# Patient Record
Sex: MALE | Race: OTHER | NOT HISPANIC OR LATINO
[De-identification: names, ages, dates, MRNs, and addresses within clinical notes are randomized per-mention and may not be internally consistent; named-entity substitution may affect disease eponyms.]

---

## 2018-10-12 PROBLEM — Z00.00 ENCOUNTER FOR PREVENTIVE HEALTH EXAMINATION: Status: ACTIVE | Noted: 2018-10-12

## 2018-10-17 ENCOUNTER — APPOINTMENT (OUTPATIENT)
Dept: OPHTHALMOLOGY | Facility: CLINIC | Age: 48
End: 2018-10-17
Payer: COMMERCIAL

## 2018-10-17 DIAGNOSIS — H53.10 UNSPECIFIED SUBJECTIVE VISUAL DISTURBANCES: ICD-10-CM

## 2018-10-17 PROCEDURE — 99204 OFFICE O/P NEW MOD 45 MIN: CPT

## 2018-10-17 PROCEDURE — 92083 EXTENDED VISUAL FIELD XM: CPT

## 2018-10-17 RX ORDER — OMEGA-3/DHA/EPA/FISH OIL 300-1000MG
CAPSULE ORAL
Refills: 0 | Status: ACTIVE | COMMUNITY

## 2021-05-10 ENCOUNTER — NON-APPOINTMENT (OUTPATIENT)
Age: 51
End: 2021-05-10

## 2021-05-11 ENCOUNTER — APPOINTMENT (OUTPATIENT)
Dept: ORTHOPEDIC SURGERY | Facility: CLINIC | Age: 51
End: 2021-05-11
Payer: COMMERCIAL

## 2021-05-11 VITALS
HEIGHT: 68 IN | SYSTOLIC BLOOD PRESSURE: 123 MMHG | BODY MASS INDEX: 22.73 KG/M2 | HEART RATE: 61 BPM | DIASTOLIC BLOOD PRESSURE: 69 MMHG | WEIGHT: 150 LBS

## 2021-05-11 DIAGNOSIS — S46.111A STRAIN OF MUSCLE, FASCIA AND TENDON OF LONG HEAD OF BICEPS, RIGHT ARM, INITIAL ENCOUNTER: ICD-10-CM

## 2021-05-11 PROCEDURE — 99204 OFFICE O/P NEW MOD 45 MIN: CPT

## 2021-05-11 PROCEDURE — 99072 ADDL SUPL MATRL&STAF TM PHE: CPT

## 2021-05-11 NOTE — HISTORY OF PRESENT ILLNESS
[de-identified] : Patient is here for right wrist/shoulder pain. These pains began around 2-3 months ago after falling while skiing. He was seen at urgent care centers where xrays were taken that were negative for fracture. He has taken Advil occasionally for pain. He has noticed improvement in his condition but not full resolution of pain. \par \par The patient's past medical history, past surgical history, medications and allergies were reviewed by me today and documented accordingly. In addition, the patient's family and social history, which were noncontributory to this visit, were reviewed also. Intake form was reviewed. The patient has no family history of arthritis.

## 2021-05-11 NOTE — PHYSICAL EXAM
[de-identified] : Constitutional: Well-nourished, well-developed, No acute distress\par Respiratory:  Good respiratory effort, no SOB\par Lymphatic: No regional lymphadenopathy, no lymphedema\par Psychiatric: Pleasant and normal affect, alert and oriented x3\par Musculoskeletal: normal except where as noted in regional exam\par \par Right shoulder:\par APPEARANCE: + Deformity of the biceps muscle consistent with long head biceps tendon rupture, no other marked deformities, no swelling or malalignment\par POSITIVE TENDERNESS: supraspinatus\par NONTENDER:  infraspinatus, teres minor. biceps. anterior and posterior capsule. AC joint. \par ROM: full with mild painful arc past 60 degrees, no scapular winging or dyskinesia present\par RESISTIVE TESTING: MMT 4+/5 ER and empty can, 5/5 IR. painless 5/5 resisted flex/ext, horizontal abd/add \par SPECIAL TESTS: + Alcocer and Neers, mildly + cross arm adduction, neg Speeds, neg Kelsey's, neg Drop Arm, neg Apprehension. neg apley's scratch test\par  \par Left hand:\par APPEARANCE:  mild swelling at first digit MCP joint, no marked deformities or malalignment of the fingers\par POSITIVE TENDERNESS: Thumb UCL\par NONTENDER: all other osseous and ligamentous structures. \par ROM: Limited motion of thumb MCP joint due to pain, otherwise full & painless in CMC, MCP, and IP joints. \par RESISTIVE TESTING: painless resisted testing. \par SPECIAL TESTS: + pain with moderate laxity on valgus stress of the first digit MCP joint, normal sensation of 1st through 5th digits, normal flex/ext, abd/add, and opposition of thumb, no triggering of fingers\par \par Right wrist:\par APPEARANCE: no swelling, no marked deformities or malalignment\par POSITIVE TENDERNESS: Dorsal DRUJ, scapholunate\par NONTENDER: snuffbox, TFCC, FCU/FCR, ECU/ECRL/ECRB, radial/ulnar styloid, CMC, and MCP joints.\par ROM: full with mild pain at endrange of extension  \par RESISTIVE TESTING: Mild pain 4/5 resisted wrist flexion/extension, and radial/ulnar deviation. \par SPECIAL TESTS: neg Finkelstein's. neg Phalen's. neg reverse Phalen's. neg Castañeda's. neg migue test. neg TFCC grind. neg tinel's at the carpal tunnel\par  [de-identified] : I reviewed, interpreted and clinically correlated the following outside imaging studies,\par Urgent care x-rays, 3 views of the right wrist, no abnormal findings.\par Urgent care x-rays, 3 views of the right shoulder, no abnormal findings.

## 2021-05-11 NOTE — DISCUSSION/SUMMARY
[de-identified] : Discussed findings of today's exam and possible causes of patient's pain.  Educated patient on their most probable diagnosis of right shoulder pain status post fall skiing 1-2 months ago with resultant subacromial impingement.  Patient has noted deformity of the biceps muscle secondary to long head biceps rupture, however this precedes the recent shoulder injury, this happened approximately 2 years ago.  Reviewed possible courses of treatment, and we collaboratively decided best course of treatment at this time will include conservative management.  Patient started on a course of oral NSAIDs, prescription given for Mobic (We discussed all possible side effects of this medication).  Patient will be started on a course of physical therapy to restore normal range of motion and strength as tolerated.  Patient advised with the chronicity of his long head biceps rupture I would recommend watchful waiting, surgical intervention at this time would be primarily for cosmetic appearance, it is not providing him any functional deficit over the last few years since his injury.  Patient does not have any evidence of acute rotator cuff tear, recommend deferral of MRI at this time.\par Patient is also seen today for evaluation of right wrist pain status post fall while skiing.  This appears to be a sprain over the dorsal wrist, he does not have any evidence of significant ligament tear or myofascial injury, and patient had x-ray at time of injury which does not show any evidence of acute fracture.  Recommend treatment with oral NSAIDs as above.  If he has persisting pain may consider physical therapy.  If he continues to have localized pain may consider MRI at that time.\par Patient is also seen today for evaluation of left thumb pain 2-month s/p fall while skiing, he has evidence of ulnar collateral ligament sprain, and potentially a significant tear/rupture.  At this time recommend advanced imaging with MRI to evaluate for left thumb UCL rupture.  Patient will follow up when MRI results are available.  Patient appreciates and agrees with current plan.\par \par This note was generated using dragon medical dictation software.  A reasonable effort has been made for proofreading its contents, but typos may still remain.  If there are any questions or points of clarification needed please notify my office.\par

## 2021-06-04 ENCOUNTER — NON-APPOINTMENT (OUTPATIENT)
Age: 51
End: 2021-06-04

## 2021-06-04 DIAGNOSIS — S63.642A SPRAIN OF METACARPOPHALANGEAL JOINT OF LEFT THUMB, INITIAL ENCOUNTER: ICD-10-CM

## 2021-06-07 ENCOUNTER — APPOINTMENT (OUTPATIENT)
Dept: ORTHOPEDIC SURGERY | Facility: CLINIC | Age: 51
End: 2021-06-07
Payer: COMMERCIAL

## 2021-06-07 DIAGNOSIS — M75.41 IMPINGEMENT SYNDROME OF RIGHT SHOULDER: ICD-10-CM

## 2021-06-07 PROCEDURE — 99213 OFFICE O/P EST LOW 20 MIN: CPT | Mod: 25

## 2021-06-07 PROCEDURE — 99072 ADDL SUPL MATRL&STAF TM PHE: CPT

## 2021-06-07 PROCEDURE — 20610 DRAIN/INJ JOINT/BURSA W/O US: CPT | Mod: RT

## 2021-06-07 NOTE — PROCEDURE
[de-identified] : Injection: Right  Shoulder Subacromial Space.\par Indication: Impingement.\par \par A discussion was had with the patient regarding this procedure and all questions were answered. All risks, benefits and alternatives were discussed. These included but were not limited to bleeding, infection, and allergic reaction.  A timeout was done to ensure correct side and pt agreed to the procedure.   Alcohol was used to clean the skin, and betadine was used to sterilize and prep the area in the posterior aspect of the shoulder. Ethyl chloride spray was then used as a topical anesthetic. A 22-gauge 1.5" needle was used to inject 2cc of 0.25% bupivacaine without epinephrine and 1cc of 40mg/ml methylprednisolone into the subacromial space. A sterile bandage was then applied. The patient tolerated the procedure well and there were no complications.\par

## 2021-06-07 NOTE — HISTORY OF PRESENT ILLNESS
[de-identified] : Patient is here today for follow-up and continue management of right shoulder pain. Since last visit patient has been unable to start physical therapy, he has not been able to find a time in his schedule to do so. He is still having pain with overhead activity, pain with sleeping on his side at night, and describes the pain as a strong achy pain in the shoulder area that radiates into the upper arm. No numbness/tingling, no radiation of pain beyond the elbow. No other work-up or treatment as of yet.

## 2021-06-07 NOTE — DISCUSSION/SUMMARY
[de-identified] : Patient was seen today for reevaluation and continue management of right shoulder pain secondary to subacromial impingement.  We discussed enrollment in the FDA trial looking at long-acting triamcinolone injection, patient was concerned about potential long-term side effects and elected to defer this injection today.  We discussed utilization of regular short acting Depo-Medrol, patient was more comfortable with this injection therapy today.  We discussed various treatment options as well as associated risk/benefits/alternatives and patient elected to proceed with cortisone injection today (see procedure note).  Informed the patient that the numbing medicine in today's injection will last for about 4-6 hours. The steroid that was injected will start to work in 1 to 2 days, peak at 1-2 weeks, and may last up to 1-2 months.  Patient will be started on a course of physical therapy to restore normal range of motion and strength as tolerated.  If patient does not have improvement with the above measures would consider MRI at that time.  Follow up as needed.  Patient appreciates and agrees with current plan.\par \par This note was generated using dragon medical dictation software.  A reasonable effort has been made for proofreading its contents, but typos may still remain.  If there are any questions or points of clarification needed please notify my office.\par

## 2021-06-07 NOTE — PHYSICAL EXAM
[de-identified] : Constitutional: Well-nourished, well-developed, No acute distress\par Respiratory:  Good respiratory effort, no SOB\par Lymphatic: No regional lymphadenopathy, no lymphedema\par Psychiatric: Pleasant and normal affect, alert and oriented x3\par Musculoskeletal: normal except where as noted in regional exam\par \par Right shoulder:\par APPEARANCE: + Deformity of the biceps muscle consistent with long head biceps tendon rupture, no other marked deformities, no swelling or malalignment\par POSITIVE TENDERNESS: supraspinatus\par NONTENDER:  infraspinatus, teres minor. biceps. anterior and posterior capsule. AC joint. \par ROM: full with mild painful arc past 60 degrees, no scapular winging or dyskinesia present\par RESISTIVE TESTING: MMT 4+/5 ER and empty can, 5/5 IR. painless 5/5 resisted flex/ext, horizontal abd/add \par SPECIAL TESTS: + Alcocer and Neers, mildly + cross arm adduction, neg Speeds, neg Kelsey's, neg Drop Arm, neg Apprehension. neg apley's scratch test\par

## 2021-06-21 ENCOUNTER — APPOINTMENT (OUTPATIENT)
Dept: ORTHOPEDIC SURGERY | Facility: CLINIC | Age: 51
End: 2021-06-21
Payer: COMMERCIAL

## 2021-06-21 VITALS
SYSTOLIC BLOOD PRESSURE: 120 MMHG | WEIGHT: 145 LBS | BODY MASS INDEX: 21.98 KG/M2 | HEART RATE: 62 BPM | HEIGHT: 68 IN | DIASTOLIC BLOOD PRESSURE: 81 MMHG

## 2021-06-21 DIAGNOSIS — Z78.9 OTHER SPECIFIED HEALTH STATUS: ICD-10-CM

## 2021-06-21 DIAGNOSIS — Z80.41 FAMILY HISTORY OF MALIGNANT NEOPLASM OF OVARY: ICD-10-CM

## 2021-06-21 DIAGNOSIS — M25.531 PAIN IN RIGHT WRIST: ICD-10-CM

## 2021-06-21 PROCEDURE — 99214 OFFICE O/P EST MOD 30 MIN: CPT

## 2021-06-21 PROCEDURE — 99072 ADDL SUPL MATRL&STAF TM PHE: CPT

## 2021-06-22 PROBLEM — M25.531 RIGHT WRIST PAIN: Status: ACTIVE | Noted: 2021-05-11

## 2021-07-29 ENCOUNTER — APPOINTMENT (OUTPATIENT)
Dept: ORTHOPEDIC SURGERY | Facility: CLINIC | Age: 51
End: 2021-07-29
Payer: COMMERCIAL

## 2021-07-29 DIAGNOSIS — S63.642D SPRAIN OF METACARPOPHALANGEAL JOINT OF LEFT THUMB, SUBSEQUENT ENCOUNTER: ICD-10-CM

## 2021-07-29 PROCEDURE — 99214 OFFICE O/P EST MOD 30 MIN: CPT

## 2021-07-29 RX ORDER — MELOXICAM 7.5 MG/1
7.5 TABLET ORAL
Qty: 30 | Refills: 0 | Status: COMPLETED | COMMUNITY
Start: 2021-05-11 | End: 2021-07-29

## 2021-09-09 ENCOUNTER — OUTPATIENT (OUTPATIENT)
Dept: OUTPATIENT SERVICES | Facility: HOSPITAL | Age: 51
LOS: 1 days | End: 2021-09-09
Payer: COMMERCIAL

## 2021-09-09 VITALS
OXYGEN SATURATION: 100 % | TEMPERATURE: 97 F | HEIGHT: 68 IN | RESPIRATION RATE: 16 BRPM | DIASTOLIC BLOOD PRESSURE: 82 MMHG | SYSTOLIC BLOOD PRESSURE: 124 MMHG | HEART RATE: 55 BPM | WEIGHT: 149.91 LBS

## 2021-09-09 DIAGNOSIS — S63.642A SPRAIN OF METACARPOPHALANGEAL JOINT OF LEFT THUMB, INITIAL ENCOUNTER: ICD-10-CM

## 2021-09-09 DIAGNOSIS — D17.9 BENIGN LIPOMATOUS NEOPLASM, UNSPECIFIED: Chronic | ICD-10-CM

## 2021-09-09 DIAGNOSIS — Z01.818 ENCOUNTER FOR OTHER PREPROCEDURAL EXAMINATION: ICD-10-CM

## 2021-09-09 DIAGNOSIS — S63.642D SPRAIN OF METACARPOPHALANGEAL JOINT OF LEFT THUMB, SUBSEQUENT ENCOUNTER: ICD-10-CM

## 2021-09-09 LAB
HCT VFR BLD CALC: 43.4 % — SIGNIFICANT CHANGE UP (ref 39–50)
HGB BLD-MCNC: 13.5 G/DL — SIGNIFICANT CHANGE UP (ref 13–17)
MCHC RBC-ENTMCNC: 27 PG — SIGNIFICANT CHANGE UP (ref 27–34)
MCHC RBC-ENTMCNC: 31.1 GM/DL — LOW (ref 32–36)
MCV RBC AUTO: 86.8 FL — SIGNIFICANT CHANGE UP (ref 80–100)
NRBC # BLD: 0 /100 WBCS — SIGNIFICANT CHANGE UP (ref 0–0)
PLATELET # BLD AUTO: 274 K/UL — SIGNIFICANT CHANGE UP (ref 150–400)
RBC # BLD: 5 M/UL — SIGNIFICANT CHANGE UP (ref 4.2–5.8)
RBC # FLD: 13.2 % — SIGNIFICANT CHANGE UP (ref 10.3–14.5)
WBC # BLD: 6.04 K/UL — SIGNIFICANT CHANGE UP (ref 3.8–10.5)
WBC # FLD AUTO: 6.04 K/UL — SIGNIFICANT CHANGE UP (ref 3.8–10.5)

## 2021-09-09 PROCEDURE — G0463: CPT

## 2021-09-09 PROCEDURE — 85027 COMPLETE CBC AUTOMATED: CPT

## 2021-09-09 NOTE — H&P PST ADULT - SKIN COMMENTS
Pt. will call the surgeon tomorrow to inform about recent abscess. Will email from PST also. Pt. will call the surgeon tomorrow to inform about recent abscess. Will inform from PST also. Pt. will call the surgeon tomorrow to inform about recent abscess. Surgeon notified.

## 2021-09-09 NOTE — H&P PST ADULT - HISTORY OF PRESENT ILLNESS
50 y.o. male with  c/o Left thumb pain s/p multiple injuries related to skiing and activity over the past year. An MRI from 05/25/21 showed a Left thumb ulnar collateral ligament tear. He is scheduled for Left Thumb Ulnar Collateral Ligament Reconstruction with [employer].     Covid testing scheduled for 09/17/21.  50 y.o. male with  c/o Left thumb pain s/p multiple injuries related to skiing and activity over the past year. An MRI from 05/25/21 showed a Left thumb ulnar collateral ligament tear. He is scheduled for Left Thumb Ulnar Collateral Ligament Reconstruction with [employer].     Covid testing scheduled for 09/17/21.  50 y.o. male c/o Left thumb pain s/p multiple injuries related to skiing and activity over the past year. An MRI from 05/25/21 showed a Left thumb ulnar collateral ligament tear. He is scheduled for Left Thumb Ulnar Collateral Ligament Reconstruction with [employer].     Covid testing scheduled for 09/17/21.

## 2021-09-09 NOTE — H&P PST ADULT - NS MD HP SKIN WOUND STATUS
Left upper forearm 1 cm healing wound and small lump s/p I&D of an abscess about 10 days ago at plastic surgeon's office (Pt. doesn't recall the name). Finished abx yesterday.

## 2021-09-09 NOTE — H&P PST ADULT - NSICDXPASTMEDICALHX_GEN_ALL_CORE_FT
PAST MEDICAL HISTORY:  Sprain of metacarpophalangeal joint of left thumb, initial encounter      PAST MEDICAL HISTORY:  Multiple lipomas     Right shoulder tendonitis 2021    Rupture of tendon of biceps, long head, right, initial encounter 2019    Skin abscess - left upper forearm    Sprain of metacarpophalangeal joint of left thumb, initial encounter 2021

## 2021-09-09 NOTE — H&P PST ADULT - SKIN/BREAST COMMENTS
Left upper forearm 1 cm healing wound s/p I&D of an abscess about 10 days ago. Finished abx yesterday. s/p I&D of an abscess Left forearm about 10 days ago. Finished abx yesterday.

## 2021-09-15 PROBLEM — S46.111A STRAIN OF MUSCLE, FASCIA AND TENDON OF LONG HEAD OF BICEPS, RIGHT ARM, INITIAL ENCOUNTER: Chronic | Status: ACTIVE | Noted: 2021-09-09

## 2021-09-15 PROBLEM — S63.642A SPRAIN OF METACARPOPHALANGEAL JOINT OF LEFT THUMB, INITIAL ENCOUNTER: Chronic | Status: ACTIVE | Noted: 2021-09-09

## 2021-09-15 PROBLEM — L02.91 CUTANEOUS ABSCESS, UNSPECIFIED: Chronic | Status: ACTIVE | Noted: 2021-09-09

## 2021-09-15 PROBLEM — M77.8 OTHER ENTHESOPATHIES, NOT ELSEWHERE CLASSIFIED: Chronic | Status: ACTIVE | Noted: 2021-09-09

## 2021-09-15 PROBLEM — D17.9 BENIGN LIPOMATOUS NEOPLASM, UNSPECIFIED: Chronic | Status: ACTIVE | Noted: 2021-09-09

## 2021-09-20 ENCOUNTER — APPOINTMENT (OUTPATIENT)
Dept: ORTHOPEDIC SURGERY | Facility: HOSPITAL | Age: 51
End: 2021-09-20

## 2021-10-01 ENCOUNTER — APPOINTMENT (OUTPATIENT)
Dept: ORTHOPEDIC SURGERY | Facility: CLINIC | Age: 51
End: 2021-10-01

## 2021-10-06 ENCOUNTER — APPOINTMENT (OUTPATIENT)
Dept: INFECTIOUS DISEASE | Facility: CLINIC | Age: 51
End: 2021-10-06
Payer: COMMERCIAL

## 2021-10-06 VITALS
OXYGEN SATURATION: 100 % | SYSTOLIC BLOOD PRESSURE: 108 MMHG | DIASTOLIC BLOOD PRESSURE: 77 MMHG | HEART RATE: 69 BPM | BODY MASS INDEX: 22.73 KG/M2 | HEIGHT: 68 IN | WEIGHT: 150 LBS | TEMPERATURE: 98.1 F

## 2021-10-06 PROCEDURE — 99213 OFFICE O/P EST LOW 20 MIN: CPT

## 2021-10-06 NOTE — ASSESSMENT
[FreeTextEntry1] : Status post incision and drainage of abscess left forearm.  He is doing well.  I have no real suspicion for any immune issue here, but I will send appropriate screening tests.  We did discuss the transient nature potentially of MRSA colonization.  We discussed the lack of data for checking a nasal swab, as the colonization can be transient.  Nasal colonization in and of itself does not mean the patient needs treatment.  We discussed the multimodal treatment of colonization for recurrent, significant disease, and the relatively little data there is to support it (e.g. open heart surgery, knee replacement, hip replacement, nursing homes, etc.).  I do not think he merits decolonization at this point in time.  We did discuss that if she should have significant recurrent infections, that this could be revisited at any point in time.  I think that he should see a dermatologist, if he does have psoriasis or eczema appropriate treatment of these will diminish the staphylococcal burden, and potentially diminish the likelihood of subsequent infection.  I would also want the dermatologist to look at this mole/lesion on the upper back/base of his neck.  The patient voiced understanding.  I do not think the patient's headaches have any direct bearing on this is staphylococcal infection.  The patient has seen a neurologist in the past after his prior head trauma, and I encouraged him to do so again given the change in the pattern of his headaches.  I do not think this gentleman merits having blood cultures performed at this juncture, suspicion for systemic illness at this point in time is nil.\par \par Patient may follow-up as needed

## 2021-10-06 NOTE — HISTORY OF PRESENT ILLNESS
[FreeTextEntry1] : This is a 50-year-old man with history of multiple orthopedic injuries, multiple lipomas excised, prior concussion, chronic headaches recently worse over the last several months, who had a abscess that required drainage on his left forearm in August 2021.  No antecedent risk factors for MRSA were elicited.  Responded nicely to drainage and antibiotics.  He also socially had a small folliculitis type lesions in his groin which resolved.  He has had issues with acne-like facial rash, but he has had similar issues in the more distant past.  He has had no fevers, chills, rigors or systemic illness.  He is overall been feeling fatigued but otherwise reasonably well.  He presents now because he is concerned that he might have a systemic staphylococcal infection.\par \par Patient denies any history of immune compromise.  He did has not had a prior history of skin and soft tissue infections other than the aforementioned acneiform rash on his face in the past.  He does have some eczematous/psoriasis type rash intermittently on his forearms, anterior shins, knees, elbows though it at other times he thinks is just dry skin.  He has not seen a dermatologist.  His son has significant allergies and eczema.\par \par The patient has no significant occupational or travel exposures.  There are no pets in the home. 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

## 2021-10-06 NOTE — PHYSICAL EXAM
[General Appearance - Alert] : alert [General Appearance - In No Acute Distress] : in no acute distress [General Appearance - Well-Appearing] : healthy appearing [Sclera] : the sclera and conjunctiva were normal [Examination Of The Oral Cavity] : the lips and gums were normal [Neck Appearance] : the appearance of the neck was normal [Auscultation Breath Sounds / Voice Sounds] : lungs were clear to auscultation bilaterally [Murmurs] : no murmurs [Heart Sounds] : normal S1 and S2 [Edema] : there was no peripheral edema [Abdomen Tenderness] : non-tender [Abdomen Soft] : soft [] : no hepato-splenomegaly [No Palpable Adenopathy] : no palpable adenopathy [Musculoskeletal - Swelling] : no joint swelling [Oriented To Time, Place, And Person] : oriented to person, place, and time [FreeTextEntry1] : Seems anxious slightly more than I would expect for current situation

## 2021-10-07 LAB
ALBUMIN SERPL ELPH-MCNC: 4.4 G/DL
ALP BLD-CCNC: 53 U/L
ALT SERPL-CCNC: 18 U/L
ANION GAP SERPL CALC-SCNC: 13 MMOL/L
AST SERPL-CCNC: 23 U/L
BASOPHILS # BLD AUTO: 0.04 K/UL
BASOPHILS NFR BLD AUTO: 0.6 %
BILIRUB SERPL-MCNC: 0.3 MG/DL
BUN SERPL-MCNC: 10 MG/DL
CALCIUM SERPL-MCNC: 9.4 MG/DL
CD3 CELLS # BLD: 813 /UL
CD3 CELLS NFR BLD: 67 %
CD3+CD4+ CELLS # BLD: 639 /UL
CD3+CD4+ CELLS NFR BLD: 52 %
CD3+CD4+ CELLS/CD3+CD8+ CLL SPEC: 3.75 RATIO
CD3+CD8+ CELLS # SPEC: 170 /UL
CD3+CD8+ CELLS NFR BLD: 14 %
CHLORIDE SERPL-SCNC: 101 MMOL/L
CO2 SERPL-SCNC: 28 MMOL/L
CREAT SERPL-MCNC: 1.27 MG/DL
CRP SERPL-MCNC: <3 MG/L
EOSINOPHIL # BLD AUTO: 0.11 K/UL
EOSINOPHIL NFR BLD AUTO: 1.6 %
ERYTHROCYTE [SEDIMENTATION RATE] IN BLOOD BY WESTERGREN METHOD: 8 MM/HR
GLUCOSE SERPL-MCNC: 88 MG/DL
HCT VFR BLD CALC: 45.2 %
HGB BLD-MCNC: 14.1 G/DL
HIV1+2 AB SPEC QL IA.RAPID: NONREACTIVE
IMM GRANULOCYTES NFR BLD AUTO: 0.6 %
LYMPHOCYTES # BLD AUTO: 1.37 K/UL
LYMPHOCYTES NFR BLD AUTO: 20.1 %
MAN DIFF?: NORMAL
MCHC RBC-ENTMCNC: 27.3 PG
MCHC RBC-ENTMCNC: 31.2 GM/DL
MCV RBC AUTO: 87.4 FL
MONOCYTES # BLD AUTO: 0.47 K/UL
MONOCYTES NFR BLD AUTO: 6.9 %
NEUTROPHILS # BLD AUTO: 4.77 K/UL
NEUTROPHILS NFR BLD AUTO: 70.2 %
PLATELET # BLD AUTO: 293 K/UL
POTASSIUM SERPL-SCNC: 4.6 MMOL/L
PROT SERPL-MCNC: 6.6 G/DL
RBC # BLD: 5.17 M/UL
RBC # FLD: 13.1 %
RHEUMATOID FACT SER QL: <10 IU/ML
SODIUM SERPL-SCNC: 142 MMOL/L
WBC # FLD AUTO: 6.8 K/UL

## 2021-10-13 LAB
ANA SER IF-ACNC: NEGATIVE
IGA SER QL IEP: 184 MG/DL
IGG SER QL IEP: 868 MG/DL
IGG SUBSET TOTAL IGG: 813 MG/DL
IGG1 SER-MCNC: 461 MG/DL
IGG2 SER-MCNC: 249 MG/DL
IGG3 SER-MCNC: 54 MG/DL
IGG4 SER-MCNC: 64 MG/DL
IGM SER QL IEP: 99 MG/DL

## 2023-07-28 ENCOUNTER — APPOINTMENT (OUTPATIENT)
Dept: INFECTIOUS DISEASE | Facility: CLINIC | Age: 53
End: 2023-07-28
Payer: COMMERCIAL

## 2023-07-28 ENCOUNTER — NON-APPOINTMENT (OUTPATIENT)
Age: 53
End: 2023-07-28

## 2023-07-28 VITALS
DIASTOLIC BLOOD PRESSURE: 75 MMHG | BODY MASS INDEX: 21.98 KG/M2 | OXYGEN SATURATION: 98 % | WEIGHT: 145 LBS | SYSTOLIC BLOOD PRESSURE: 113 MMHG | HEIGHT: 68 IN | HEART RATE: 51 BPM | TEMPERATURE: 97.8 F

## 2023-07-28 DIAGNOSIS — Z22.322 CARRIER OR SUSPECTED CARRIER OF METHICILLIN RESISTANT STAPHYLOCOCCUS AUREUS: ICD-10-CM

## 2023-07-28 DIAGNOSIS — L73.8 OTHER SPECIFIED FOLLICULAR DISORDERS: ICD-10-CM

## 2023-07-28 PROCEDURE — 99214 OFFICE O/P EST MOD 30 MIN: CPT

## 2023-07-28 NOTE — PHYSICAL EXAM
[General Appearance - Alert] : alert [General Appearance - In No Acute Distress] : in no acute distress [Sclera] : the sclera and conjunctiva were normal [PERRL With Normal Accommodation] : pupils were equal in size, round, reactive to light [Extraocular Movements] : extraocular movements were intact [Outer Ear] : the ears and nose were normal in appearance [Oropharynx] : the oropharynx was normal with no thrush [Neck Appearance] : the appearance of the neck was normal [] : no respiratory distress [Auscultation Breath Sounds / Voice Sounds] : lungs were clear to auscultation bilaterally [Heart Rate And Rhythm] : heart rate was normal and rhythm regular [Heart Sounds] : normal S1 and S2 [Heart Sounds Gallop] : no gallops [Murmurs] : no murmurs [Heart Sounds Pericardial Friction Rub] : no pericardial rub [Abdomen Tenderness] : non-tender [FreeTextEntry1] : Distal RUE s/p I&D, no purulence noted; flesh-colored skin lesion noted. no erythema; scant shiny-appearing discoloration at antecubital fossa b/l [No Focal Deficits] : no focal deficits

## 2023-07-28 NOTE — ASSESSMENT
[FreeTextEntry1] : 50-yo M w/ PMH of multiple orthopedic injuries, s/p multiple lipoma excision, and last visit to ID clinic with concerns for MRSA colonization, presenting with concerns for MRSA infection.\par \par #MRSA colonization\par #Bacterial folliculitis\par - Recurrent folliculitis, possibly a/w hairy skin and razorblade use. Discussed hygiene re: shaving\par - Discussed on the nature of Staph aureus on skin. Discussed on possibility of the use of chlorhexidine shower for MRSA colonization eradication, however it (1) may recur, and (2) may not prevent folliculitis itself\par - Patient may benefit from eczema care from dermatology\par - Discussed non-irritant skin products and hydration\par - Discussed on previous lab result (2021). Would offer no further testing for "systemic infection"\par - No antimicrobials to prescribe at this time. Patient is in agreement.

## 2023-07-28 NOTE — HISTORY OF PRESENT ILLNESS
[FreeTextEntry1] : 50-yo M w/ PMH of multiple orthopedic injuries, s/p multiple lipoma excision, and last visit to ID clinic with concerns for MRSA colonization, presenting with concerns for MRSA infection.\par \par Patient reports he has a propensity to have boils on the skin, including BUE, flexural surfaces of the antecubital and popliteal fossa, and abdominal fold. Also happens on the flat surfaces of the body.\par \par Patient recently developed a big boil to the anterior aspect of the distal RUE. S/p I&D, and pt was told it was MRSA by the surgeon (no record available - patient reported sending it over to our clinic), and currently on antibiotics (name unclear - 1 tab BID x10d). Currently much improved. Referred here for a possible workup of active infectious disease.\par \par Patient has body hair, and stated he would use battery-powered razor to shave once in a while. Also uses Dove product, including other store products for skin care.\par

## 2023-07-28 NOTE — REVIEW OF SYSTEMS
[Skin Lesions] : skin lesion [Itching] : itching [Fever] : no fever [Chills] : no chills [Body Aches] : no body aches [Discharge From Eyes] : no purulent discharge from the eyes [Nasal Discharge] : no nasal discharge [Hoarseness] : no hoarseness [Chest Pain] : no chest pain [Lower Ext Edema] : no extremity edema [Shortness Of Breath] : no shortness of breath [Cough] : no cough [Sputum] : not coughing up ~M sputum [Dysuria] : no dysuria [Joint Pain] : no joint pain [Joint Swelling] : no joint swelling [Joint Stiffness] : no joint stiffness [Confused] : no confusion [Dizziness] : no dizziness [Suicidal] : not suicidal [Anxiety] : no anxiety [Swollen Glands] : no swollen glands

## 2024-01-31 ENCOUNTER — APPOINTMENT (OUTPATIENT)
Dept: ORTHOPEDIC SURGERY | Facility: CLINIC | Age: 54
End: 2024-01-31
Payer: COMMERCIAL

## 2024-01-31 DIAGNOSIS — S83.412A SPRAIN OF MEDIAL COLLATERAL LIGAMENT OF LEFT KNEE, INITIAL ENCOUNTER: ICD-10-CM

## 2024-01-31 DIAGNOSIS — S63.637A SPRAIN OF INTERPHALANGEAL JOINT OF LEFT LITTLE FINGER, INITIAL ENCOUNTER: ICD-10-CM

## 2024-01-31 DIAGNOSIS — M70.62 TROCHANTERIC BURSITIS, LEFT HIP: ICD-10-CM

## 2024-01-31 PROCEDURE — 99213 OFFICE O/P EST LOW 20 MIN: CPT

## 2024-01-31 NOTE — DISCUSSION/SUMMARY
[de-identified] : Discussed findings of today's exam and possible causes of patient's pain.  Educated patient on their most probable diagnosis of acute left knee pain 1 week status post slip and twist on a wet surface with resulting grade 1 MCL sprain.  Reviewed possible courses of treatment, and we collaboratively decided best course of treatment at this time will include conservative management.  Patient has no effusion, no evidence of acute internal derangement, no immediate surgical indications, recommend deferral of MRI at this time.  Patient was fitted with a hinged knee compression brace to be worn during the day for support.  Patient is advised he likely has a grade 1 sprain, , this injury can take 4-6 weeks to fully resolve, this is unlikely to require surgical intervention, therefore MRI is not needed at this time.  Patient will be started on a course of physical therapy to restore normal range of motion and strength as tolerated. Patient was also seen today for evaluation management of lateral left hip pain, he likely has a hip contusion with resultant trochanteric bursitis.  There is no evidence of internal derangement of the hip, no evidence of any high-grade myofascial tear or rupture, again no surgical indications and no need for MRI.  He would benefit from a short course of anti-inflammatory, recommend Advil 600 mg p.o. twice daily with food consistently for 1 week, then as needed thereafter.  If this issue does not resolve over the next few weeks he can add this to his course of PT. Patient was also seen today for evaluation management of left hand fifth digit PIP sprain, this is again a grade 1 sprain.  He already has full range of motion and full strength.  No need for immobilization or buddy taping at this time.  No need for hand therapy at this time. Follow up as needed.  Patient appreciates and agrees with current plan.  I work as part of an academic orthopedic group and routinely have a physician in training (resident / fellow) working with me.  Any part of the history and physical exam performed by the physician in training was either directly reviewed and/or replicated by myself.  This note was generated using dragon medical dictation software.  A reasonable effort has been made for proofreading its contents, but typos may still remain.  If there are any questions or points of clarification needed please notify my office.

## 2024-01-31 NOTE — REASON FOR VISIT
[Follow-Up Visit] : a follow-up visit for [Knee Pain] : knee pain [FreeTextEntry2] : left hip and knee pain, L 5th digit hand pain

## 2024-01-31 NOTE — PHYSICAL EXAM
[de-identified] : Constitutional: Well-nourished, well-developed, No acute distress Respiratory:  Good respiratory effort, no SOB Lymphatic: No regional lymphadenopathy, no lymphedema Psychiatric: Pleasant and normal affect, alert and oriented x3 Musculoskeletal: normal except where as noted in regional exam  Left knee: APPEARANCE: no swelling, no marked deformities or malalignment POSITIVE TENDERNESS: Moderate tenderness at the MCL, most notably at the midpoint and tibial insertion, + medial joint line tenderness NONTENDER: lateral jt line & retinacula, patellar & quadriceps tendons, LCL, ITB at the lateral femoral condyle & Gerdy's tubercle, pes bursa.  ROM: full ext, with mild medial knee pain in mild restriction in deep flexion.  RESISTIVE TESTING: painless resisted knee flex/ext.  SPECIAL TESTS: + Valgus stress test with pain but no laxity, stable varus stress.  Justine's recreated medial knee pain, no locking or clicking noted.  painless grind. neg Lachman's. neg ant/post drawer.    Left hip: APPEARANCE: no marked deformities, no swelling or malalignment POSITIVE TENDERNESS: Greater trochanter, and mild distally along ITB NONTENDER: TFL, gluteal region, ischium/proximal hamstring region, hip flexor region, sartorius and pubic symphysis.  ROM: full & painless.  RESISTIVE TESTING: Mild pain in lateral hip with resisted abduction, painless resisted ER/IR/SLR/adduction.  SPECIAL TESTS: neg JUDE/FADIR, painless loaded flexion & scouring  Left hand: APPEARANCE:  + Mild swelling overlying the lateral portion of the fifth digit PIP joint, no marked deformities or malalignment of the fingers POSITIVE TENDERNESS: + Moderate tenderness of the radial collateral ligament at the fifth digit PIP joint NONTENDER: all other osseous and ligamentous structures.  ROM: full & painless in CMC, MCP, and IP joints.  RESISTIVE TESTING: painless resisted testing.  SPECIAL TESTS: + Varus stress test at the fifth digit PIP joint for pain but no laxity, negative valgus stress test at this joint Vasc: 2+ radial pulse, normal capillary refill Neuro: AIN, PIN, Ulnar nerve intact to motor Sensation: Intact to light touch throughout

## 2024-01-31 NOTE — REVIEW OF SYSTEMS
[Arthralgia] : arthralgia [Joint Stiffness] : joint stiffness [Negative] : Heme/Lymph [Joint Pain] : no joint pain

## 2024-01-31 NOTE — HISTORY OF PRESENT ILLNESS
[de-identified] : 54 y/o M presents s/p fall while playing squash 1 week ago. He was going for the fall and slipped while hyperflexing his right hip and hitting the inner aspect of his left knee. He subsequently developed left knee and outer left hip pain. He went to [facility] where x-rays were taken which were negative. He has been favoring his right side and has difficulty putting weight on his left leg. He did not his knee initially was swollen but that has subsided. Denies any back pain or radicular symptoms. Did not notice any significant bruising.  Patient is also having pain of the left hand fifth digit PIP joint.  He does not recall exactly what happened but he remembers having to reach back with his left hand.  He has been having pain at the PIP joint with some swelling in the area as well since time of injury.  No workup or treatment for this issue as of yet.

## 2024-02-28 ENCOUNTER — NON-APPOINTMENT (OUTPATIENT)
Age: 54
End: 2024-02-28

## 2024-03-25 ENCOUNTER — NON-APPOINTMENT (OUTPATIENT)
Age: 54
End: 2024-03-25

## 2024-09-19 ENCOUNTER — APPOINTMENT (OUTPATIENT)
Dept: ORTHOPEDIC SURGERY | Facility: CLINIC | Age: 54
End: 2024-09-19
Payer: COMMERCIAL

## 2024-09-19 DIAGNOSIS — S83.412A SPRAIN OF MEDIAL COLLATERAL LIGAMENT OF LEFT KNEE, INITIAL ENCOUNTER: ICD-10-CM

## 2024-09-19 PROCEDURE — 99213 OFFICE O/P EST LOW 20 MIN: CPT

## 2024-09-25 NOTE — HISTORY OF PRESENT ILLNESS
[de-identified] : 52 yo M with hx of MCL sprain previously seen in [place] for knee pain presenting today for similar concerns, reports his left knee pain has been persistent and preventing him from enjoying his regular activities not taking anything for pain, has not taken any NSAIDs/Tylenol since initial injury was doing PT but states he has had a change in insurance and has not gone back in several months

## 2024-09-25 NOTE — DISCUSSION/SUMMARY
Outpatient neurosurgery follow-up   [de-identified] : Patient was seen today for reevaluation of chronic intermittent left knee pain due to grade 1 MCL sprain.  We previously reviewed his MRI results which had no surgical indications.  Patient had a change of insurance and had a busy schedule over the summer, he has not been able to undergo physical therapy for the last several months.  He has been able to function with his ADLs, but will have intermittent pain around the medial knee.  At this time recommend a new course of physical therapy to help restore range of motion, strength and function.  No specific need for any knee bracing at this time as patient has not been wearing 1 for the last several months and has no significant instability of the MCL on clinical exam.  Follow up as needed.  Patient appreciates and agrees with current plan.  This note was generated using dragon medical dictation software.  A reasonable effort has been made for proofreading its contents, but typos may still remain.  If there are any questions or points of clarification needed please notify my office.

## 2024-09-25 NOTE — HISTORY OF PRESENT ILLNESS
[de-identified] : 52 yo M with hx of MCL sprain previously seen in [place] for knee pain presenting today for similar concerns, reports his left knee pain has been persistent and preventing him from enjoying his regular activities not taking anything for pain, has not taken any NSAIDs/Tylenol since initial injury was doing PT but states he has had a change in insurance and has not gone back in several months

## 2024-09-25 NOTE — DISCUSSION/SUMMARY
[de-identified] : Patient was seen today for reevaluation of chronic intermittent left knee pain due to grade 1 MCL sprain.  We previously reviewed his MRI results which had no surgical indications.  Patient had a change of insurance and had a busy schedule over the summer, he has not been able to undergo physical therapy for the last several months.  He has been able to function with his ADLs, but will have intermittent pain around the medial knee.  At this time recommend a new course of physical therapy to help restore range of motion, strength and function.  No specific need for any knee bracing at this time as patient has not been wearing 1 for the last several months and has no significant instability of the MCL on clinical exam.  Follow up as needed.  Patient appreciates and agrees with current plan.  This note was generated using dragon medical dictation software.  A reasonable effort has been made for proofreading its contents, but typos may still remain.  If there are any questions or points of clarification needed please notify my office.

## 2024-09-25 NOTE — PHYSICAL EXAM
[de-identified] : Constitutional: Well-nourished, well-developed, No acute distress Respiratory:  Good respiratory effort, no SOB Lymphatic: No regional lymphadenopathy, no lymphedema Psychiatric: Pleasant and normal affect, alert and oriented x3 Musculoskeletal: normal except where as noted in regional exam  Left knee: APPEARANCE: no marked deformities, no swelling or malalignment POSITIVE TENDERNESS:  + TTP of the MCL, + crepitus of the anterior knee, and tenderness of patellar retinaculum NONTENDER: jt lines b/l, patellar & quadriceps tendons, MCL/LCL, ITB at the lateral femoral condyle & Gerdy's tubercle, pes bursa.  ROM: full with some discomfort in deep knee flexion RESISTIVE TESTING: + discomfort with knee ext from deep knee flexion (stretched position), painless knee flexion.  SPECIAL TESTS: stable v/v stress. painless grind. neg Lachman's. neg ant/post drawer. neg Justine's.

## 2024-09-25 NOTE — PHYSICAL EXAM
[de-identified] : Constitutional: Well-nourished, well-developed, No acute distress Respiratory:  Good respiratory effort, no SOB Lymphatic: No regional lymphadenopathy, no lymphedema Psychiatric: Pleasant and normal affect, alert and oriented x3 Musculoskeletal: normal except where as noted in regional exam  Left knee: APPEARANCE: no marked deformities, no swelling or malalignment POSITIVE TENDERNESS:  + TTP of the MCL, + crepitus of the anterior knee, and tenderness of patellar retinaculum NONTENDER: jt lines b/l, patellar & quadriceps tendons, MCL/LCL, ITB at the lateral femoral condyle & Gerdy's tubercle, pes bursa.  ROM: full with some discomfort in deep knee flexion RESISTIVE TESTING: + discomfort with knee ext from deep knee flexion (stretched position), painless knee flexion.  SPECIAL TESTS: stable v/v stress. painless grind. neg Lachman's. neg ant/post drawer. neg Justine's.

## 2025-08-29 ENCOUNTER — LABORATORY RESULT (OUTPATIENT)
Age: 55
End: 2025-08-29

## 2025-08-29 ENCOUNTER — APPOINTMENT (OUTPATIENT)
Dept: CARDIOLOGY | Facility: CLINIC | Age: 55
End: 2025-08-29
Payer: COMMERCIAL

## 2025-08-29 VITALS
WEIGHT: 141 LBS | OXYGEN SATURATION: 100 % | BODY MASS INDEX: 21.37 KG/M2 | HEART RATE: 56 BPM | SYSTOLIC BLOOD PRESSURE: 124 MMHG | DIASTOLIC BLOOD PRESSURE: 72 MMHG | HEIGHT: 68 IN

## 2025-08-29 VITALS
HEART RATE: 56 BPM | BODY MASS INDEX: 21.37 KG/M2 | OXYGEN SATURATION: 100 % | TEMPERATURE: 98 F | RESPIRATION RATE: 14 BRPM | HEIGHT: 68 IN | SYSTOLIC BLOOD PRESSURE: 124 MMHG | DIASTOLIC BLOOD PRESSURE: 72 MMHG | WEIGHT: 141 LBS

## 2025-08-29 DIAGNOSIS — R07.89 OTHER CHEST PAIN: ICD-10-CM

## 2025-08-29 DIAGNOSIS — F10.90 ALCOHOL USE, UNSPECIFIED, UNCOMPLICATED: ICD-10-CM

## 2025-08-29 DIAGNOSIS — Z87.891 PERSONAL HISTORY OF NICOTINE DEPENDENCE: ICD-10-CM

## 2025-08-29 DIAGNOSIS — R73.03 PREDIABETES.: ICD-10-CM

## 2025-08-29 DIAGNOSIS — K21.9 GASTRO-ESOPHAGEAL REFLUX DISEASE W/OUT ESOPHAGITIS: ICD-10-CM

## 2025-08-29 DIAGNOSIS — Z00.00 ENCOUNTER FOR GENERAL ADULT MEDICAL EXAMINATION W/OUT ABNORMAL FINDINGS: ICD-10-CM

## 2025-08-29 DIAGNOSIS — E78.2 MIXED HYPERLIPIDEMIA: ICD-10-CM

## 2025-08-29 DIAGNOSIS — R00.1 BRADYCARDIA, UNSPECIFIED: ICD-10-CM

## 2025-08-29 DIAGNOSIS — R94.31 ABNORMAL ELECTROCARDIOGRAM [ECG] [EKG]: ICD-10-CM

## 2025-08-29 PROCEDURE — 93000 ELECTROCARDIOGRAM COMPLETE: CPT

## 2025-08-29 PROCEDURE — 99205 OFFICE O/P NEW HI 60 MIN: CPT | Mod: 25

## 2025-09-02 LAB
25(OH)D3 SERPL-MCNC: 33.6 NG/ML
ALBUMIN SERPL ELPH-MCNC: 4.4 G/DL
ALP BLD-CCNC: 62 U/L
ALT SERPL-CCNC: 15 U/L
ANION GAP SERPL CALC-SCNC: 12 MMOL/L
APPEARANCE: CLEAR
AST SERPL-CCNC: 29 U/L
BASOPHILS # BLD AUTO: 0.04 K/UL
BASOPHILS NFR BLD AUTO: 0.7 %
BILIRUB SERPL-MCNC: 0.4 MG/DL
BILIRUBIN URINE: NEGATIVE
BLOOD URINE: NEGATIVE
BUN SERPL-MCNC: 14 MG/DL
CALCIUM SERPL-MCNC: 9.6 MG/DL
CHLORIDE SERPL-SCNC: 102 MMOL/L
CHOLEST SERPL-MCNC: 226 MG/DL
CO2 SERPL-SCNC: 25 MMOL/L
COLOR: YELLOW
CREAT SERPL-MCNC: 1.05 MG/DL
EGFRCR SERPLBLD CKD-EPI 2021: 84 ML/MIN/1.73M2
EOSINOPHIL # BLD AUTO: 0.11 K/UL
EOSINOPHIL NFR BLD AUTO: 1.8 %
ESTIMATED AVERAGE GLUCOSE: 117 MG/DL
GLUCOSE QUALITATIVE U: NEGATIVE MG/DL
GLUCOSE SERPL-MCNC: 93 MG/DL
HBA1C MFR BLD HPLC: 5.7 %
HCT VFR BLD CALC: 44.3 %
HDLC SERPL-MCNC: 55 MG/DL
HGB BLD-MCNC: 13.8 G/DL
IMM GRANULOCYTES NFR BLD AUTO: 0.5 %
KETONES URINE: NEGATIVE MG/DL
LDLC SERPL-MCNC: 155 MG/DL
LEUKOCYTE ESTERASE URINE: ABNORMAL
LYMPHOCYTES # BLD AUTO: 1.18 K/UL
LYMPHOCYTES NFR BLD AUTO: 19.8 %
MAN DIFF?: NORMAL
MCHC RBC-ENTMCNC: 27.1 PG
MCHC RBC-ENTMCNC: 31.2 G/DL
MCV RBC AUTO: 86.9 FL
MONOCYTES # BLD AUTO: 0.4 K/UL
MONOCYTES NFR BLD AUTO: 6.7 %
NEUTROPHILS # BLD AUTO: 4.19 K/UL
NEUTROPHILS NFR BLD AUTO: 70.5 %
NITRITE URINE: NEGATIVE
NONHDLC SERPL-MCNC: 171 MG/DL
PH URINE: 6
PLATELET # BLD AUTO: 292 K/UL
POTASSIUM SERPL-SCNC: 4.5 MMOL/L
PROT SERPL-MCNC: 6.7 G/DL
PROTEIN URINE: NEGATIVE MG/DL
PSA FREE FLD-MCNC: 32 %
PSA FREE SERPL-MCNC: 0.62 NG/ML
PSA SERPL-MCNC: 1.94 NG/ML
RBC # BLD: 5.1 M/UL
RBC # FLD: 13.8 %
SODIUM SERPL-SCNC: 139 MMOL/L
SPECIFIC GRAVITY URINE: 1.01
TRIGL SERPL-MCNC: 89 MG/DL
TSH SERPL-ACNC: 1.2 UIU/ML
UROBILINOGEN URINE: 0.2 MG/DL
WBC # FLD AUTO: 5.95 K/UL

## 2025-09-10 ENCOUNTER — APPOINTMENT (OUTPATIENT)
Dept: CARDIOLOGY | Facility: CLINIC | Age: 55
End: 2025-09-10